# Patient Record
Sex: MALE
[De-identification: names, ages, dates, MRNs, and addresses within clinical notes are randomized per-mention and may not be internally consistent; named-entity substitution may affect disease eponyms.]

---

## 2017-01-01 ENCOUNTER — HOSPITAL (OUTPATIENT)
Dept: OTHER | Age: 47
End: 2017-01-01
Attending: EMERGENCY MEDICINE

## 2017-07-19 PROBLEM — R13.12 OROPHARYNGEAL DYSPHAGIA: Status: ACTIVE | Noted: 2017-07-19

## 2018-10-24 ENCOUNTER — HOSPITAL ENCOUNTER (EMERGENCY)
Facility: HOSPITAL | Age: 48
Discharge: HOME | End: 2018-10-25
Attending: EMERGENCY MEDICINE
Payer: COMMERCIAL

## 2018-10-24 DIAGNOSIS — T18.128A FOOD IMPACTION OF ESOPHAGUS, INITIAL ENCOUNTER: Primary | ICD-10-CM

## 2018-10-24 PROCEDURE — 99285 EMERGENCY DEPT VISIT HI MDM: CPT

## 2018-10-24 PROCEDURE — 96374 THER/PROPH/DIAG INJ IV PUSH: CPT

## 2018-10-24 PROCEDURE — 96375 TX/PRO/DX INJ NEW DRUG ADDON: CPT

## 2018-10-24 PROCEDURE — 96361 HYDRATE IV INFUSION ADD-ON: CPT

## 2018-10-24 RX ORDER — ONDANSETRON 2 MG/ML
4 INJECTION INTRAMUSCULAR; INTRAVENOUS ONCE
Status: COMPLETED | OUTPATIENT
Start: 2018-10-24 | End: 2018-10-24

## 2018-10-25 ENCOUNTER — ANESTHESIA (OUTPATIENT)
Dept: ENDOSCOPY | Facility: HOSPITAL | Age: 48
End: 2018-10-25
Payer: COMMERCIAL

## 2018-10-25 ENCOUNTER — ANESTHESIA EVENT (OUTPATIENT)
Dept: ENDOSCOPY | Facility: HOSPITAL | Age: 48
End: 2018-10-25
Payer: COMMERCIAL

## 2018-10-25 VITALS
TEMPERATURE: 99 F | OXYGEN SATURATION: 100 % | HEART RATE: 53 BPM | WEIGHT: 184 LBS | RESPIRATION RATE: 14 BRPM | SYSTOLIC BLOOD PRESSURE: 120 MMHG | DIASTOLIC BLOOD PRESSURE: 79 MMHG | BODY MASS INDEX: 27.89 KG/M2 | HEIGHT: 68 IN

## 2018-10-25 PROBLEM — T18.128A FOOD IMPACTION OF ESOPHAGUS, INITIAL ENCOUNTER: Status: ACTIVE | Noted: 2018-10-25
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
## 2018-10-25 PROCEDURE — 88312 SPECIAL STAINS GROUP 1: CPT | Performed by: INTERNAL MEDICINE

## 2018-10-25 PROCEDURE — A4216 STERILE WATER/SALINE, 10 ML: HCPCS

## 2018-10-25 PROCEDURE — 0DB18ZX EXCISION OF UPPER ESOPHAGUS, VIA NATURAL OR ARTIFICIAL OPENING ENDOSCOPIC, DIAGNOSTIC: ICD-10-PCS | Performed by: INTERNAL MEDICINE

## 2018-10-25 PROCEDURE — 0DC18ZZ EXTIRPATION OF MATTER FROM UPPER ESOPHAGUS, VIA NATURAL OR ARTIFICIAL OPENING ENDOSCOPIC: ICD-10-PCS | Performed by: INTERNAL MEDICINE

## 2018-10-25 PROCEDURE — 88305 TISSUE EXAM BY PATHOLOGIST: CPT | Performed by: INTERNAL MEDICINE

## 2018-10-25 RX ORDER — SODIUM CHLORIDE, SODIUM LACTATE, POTASSIUM CHLORIDE, CALCIUM CHLORIDE 600; 310; 30; 20 MG/100ML; MG/100ML; MG/100ML; MG/100ML
INJECTION, SOLUTION INTRAVENOUS CONTINUOUS PRN
Status: DISCONTINUED | OUTPATIENT
Start: 2018-10-25 | End: 2018-10-25 | Stop reason: SURG

## 2018-10-25 RX ORDER — LIDOCAINE HYDROCHLORIDE 10 MG/ML
INJECTION, SOLUTION EPIDURAL; INFILTRATION; INTRACAUDAL; PERINEURAL AS NEEDED
Status: DISCONTINUED | OUTPATIENT
Start: 2018-10-25 | End: 2018-10-25 | Stop reason: SURG

## 2018-10-25 RX ORDER — NITROGLYCERIN 0.4 MG/1
0.4 TABLET SUBLINGUAL ONCE
Status: COMPLETED | OUTPATIENT
Start: 2018-10-25 | End: 2018-10-25

## 2018-10-25 RX ORDER — SODIUM CHLORIDE, SODIUM LACTATE, POTASSIUM CHLORIDE, CALCIUM CHLORIDE 600; 310; 30; 20 MG/100ML; MG/100ML; MG/100ML; MG/100ML
INJECTION, SOLUTION INTRAVENOUS CONTINUOUS
Status: DISCONTINUED | OUTPATIENT
Start: 2018-10-25 | End: 2018-10-25

## 2018-10-25 RX ORDER — NALOXONE HYDROCHLORIDE 0.4 MG/ML
80 INJECTION, SOLUTION INTRAMUSCULAR; INTRAVENOUS; SUBCUTANEOUS AS NEEDED
Status: DISCONTINUED | OUTPATIENT
Start: 2018-10-25 | End: 2018-10-25

## 2018-10-25 RX ADMIN — LIDOCAINE HYDROCHLORIDE 25 MG: 10 INJECTION, SOLUTION EPIDURAL; INFILTRATION; INTRACAUDAL; PERINEURAL at 06:15:00

## 2018-10-25 RX ADMIN — SODIUM CHLORIDE, SODIUM LACTATE, POTASSIUM CHLORIDE, CALCIUM CHLORIDE: 600; 310; 30; 20 INJECTION, SOLUTION INTRAVENOUS at 06:14:00

## 2018-10-25 RX ADMIN — SODIUM CHLORIDE, SODIUM LACTATE, POTASSIUM CHLORIDE, CALCIUM CHLORIDE: 600; 310; 30; 20 INJECTION, SOLUTION INTRAVENOUS at 06:25:00

## 2018-10-25 NOTE — ED NOTES
Pt reports that around 1600 he was eating a quinoa bowl and feels like he has a piece of cucumber stuck in his throat. Reports that he took a large bite of food, had the food get stuck and so he spit out the rest of the bite.  States that this has happened

## 2018-10-25 NOTE — ED NOTES
Gave report to endo RN. Transport at bedside to transport pt for egd. Pt stable airway intact. No distress noted.

## 2018-10-25 NOTE — CONSULTS
[facility] HOSP - [facility]    Report of Consultation    Mayramariella Lockharts Patient Status:  Emergency    3/9/1970 MRN [id number]   Location [address] Attending Maynor Lao MD   Hosp Day # 0 PCP [place] Marie MD     Date no focal weakness or numbness  PSYCHE: no depression or anxiety  HEMATOLOGIC: no easy bruising  ENDOCRINE: no temperature intolerance    Physical Exam:   Blood pressure 128/78, pulse 54, temperature 98.7 °F (37.1 °C), temperature source Oral, resp.  rate 15

## 2018-10-25 NOTE — ANESTHESIA PREPROCEDURE EVALUATION
Anesthesia PreOp Note    HPI:     Fer Jordan is a 46year old male who presents for preoperative consultation requested by: Stephanie Ceja MD    Date of Surgery: 10/24/2018 - 10/25/2018    Procedure(s):  ESOPHAGOGASTRODUODENOSCOPY WITH CONTROL BLEED Drug use: No      Sexual activity: Not on file    Other Topics      Concerns:        Not on file    Social History Narrative      Not on file      Available pre-op labs reviewed. Vital Signs: Body mass index is 27.98 kg/m².    height is 1.727

## 2018-10-25 NOTE — OPERATIVE REPORT
ESOPHAGOGASTRODUODENOSCOPY REPORT    Patient Name:  Sophie Lucero Record #: [id number]  YOB: 1970  Date of Procedure: 10/25/2018    Referring physician: Samuel Kapadia MD    Surgeon:  Brit Nieves MD    Pre-op diagnosis: Food i hiatal hernia    Plan:   Await biopsy results  Will start patient on BID PPI  Will have patient return in 3-4 weeks for EGD and consider dilation of mild stricturing in the proximal esophagus, In the interim ensure chew food thoroughly and eat soft diet

## 2018-10-25 NOTE — ED PROVIDER NOTES
Patient Seen in: [facility] AND [facility] Emergency Department    History   Patient presents with:  FB in Throat (GI, respiratory)    Stated Complaint: food bolus no trouble breathing      HPI    47 yo M without PMH presenting for evaluation of now 7+ hours of snowden Resp 20   Wt 83.5 kg   SpO2 100%   BMI 28.39 kg/m²         Physical Exam   Constitutional: No distress. Nontoxic, spitting up saliva. HEENT: MMM. No drooling/stridor. No uvular edema. Head: Normocephalic. Eyes: No injection. Neck: No crepitance.    Ca

## 2018-10-25 NOTE — ED INITIAL ASSESSMENT (HPI)
Patient was eating around 4 pm when he felt like a piece of his food got stuck in his throat. Patient states this happens around once a year. Denies SOB at this time and is speaking in full sentences, but is unable to swallow saliva.

## 2018-10-25 NOTE — ANESTHESIA POSTPROCEDURE EVALUATION
Patient: Marty Cantrell    Procedure Summary     Date:  10/25/18 Room / Location:  [address] [place] [address] ENDOSCOPY 01 / [address] [place] [address] ENDOSCOPY    Anesthesia Start:  7336 Anesthesia Stop:  7204    Procedure:  ESOPHAGOGASTRODUODENOSCOPY WITH CONTROL BLEED OR FOREIGN BODY REMOVAL (N/

## 2019-06-27 ENCOUNTER — HOSPITAL ENCOUNTER (OUTPATIENT)
Age: 49
Discharge: ANOTHER HOSPITAL | End: 2019-06-27
Attending: FAMILY MEDICINE
Payer: COMMERCIAL

## 2019-06-27 ENCOUNTER — HOSPITAL ENCOUNTER (EMERGENCY)
Facility: HOSPITAL | Age: 49
Discharge: HOME | End: 2019-06-27
Attending: EMERGENCY MEDICINE
Payer: COMMERCIAL

## 2019-06-27 ENCOUNTER — APPOINTMENT (OUTPATIENT)
Dept: GENERAL RADIOLOGY | Facility: HOSPITAL | Age: 49
End: 2019-06-27
Attending: EMERGENCY MEDICINE
Payer: COMMERCIAL

## 2019-06-27 VITALS
SYSTOLIC BLOOD PRESSURE: 147 MMHG | HEART RATE: 46 BPM | TEMPERATURE: 99 F | RESPIRATION RATE: 18 BRPM | BODY MASS INDEX: 28.04 KG/M2 | HEIGHT: 68 IN | WEIGHT: 185 LBS | OXYGEN SATURATION: 98 % | DIASTOLIC BLOOD PRESSURE: 95 MMHG

## 2019-06-27 VITALS — HEART RATE: 76 BPM | TEMPERATURE: 98 F | RESPIRATION RATE: 24 BRPM

## 2019-06-27 DIAGNOSIS — S61.313A LACERATION OF LEFT MIDDLE FINGER WITHOUT FOREIGN BODY WITH DAMAGE TO NAIL, INITIAL ENCOUNTER: Primary | ICD-10-CM

## 2019-06-27 DIAGNOSIS — S67.10XA CRUSHING INJURY OF FINGER, INITIAL ENCOUNTER: Primary | ICD-10-CM

## 2019-06-27 PROCEDURE — 99213 OFFICE O/P EST LOW 20 MIN: CPT

## 2019-06-27 PROCEDURE — 73140 X-RAY EXAM OF FINGER(S): CPT | Performed by: EMERGENCY MEDICINE

## 2019-06-27 PROCEDURE — 99202 OFFICE O/P NEW SF 15 MIN: CPT

## 2019-06-27 PROCEDURE — 12001 RPR S/N/AX/GEN/TRNK 2.5CM/<: CPT

## 2019-06-27 PROCEDURE — 99284 EMERGENCY DEPT VISIT MOD MDM: CPT

## 2019-06-27 RX ORDER — LIDOCAINE HYDROCHLORIDE 10 MG/ML
20 INJECTION, SOLUTION EPIDURAL; INFILTRATION; INTRACAUDAL; PERINEURAL ONCE
Status: COMPLETED | OUTPATIENT
Start: 2019-06-27 | End: 2019-06-27

## 2019-06-27 RX ORDER — LIDOCAINE HYDROCHLORIDE 10 MG/ML
INJECTION, SOLUTION EPIDURAL; INFILTRATION; INTRACAUDAL; PERINEURAL
Status: COMPLETED
Start: 2019-06-27 | End: 2019-06-27

## 2019-06-27 RX ORDER — CLINDAMYCIN HYDROCHLORIDE 300 MG/1
300 CAPSULE ORAL 3 TIMES DAILY
Qty: 21 CAPSULE | Refills: 0 | Status: SHIPPED | OUTPATIENT
Start: 2019-06-27 | End: 2019-07-04

## 2019-06-27 NOTE — ED PROVIDER NOTES
Patient Seen in: [address]    History   Patient presents with:   Worker's Comp  Contusion (musculoskeletal)    Stated Complaint: w/c l-hand 3 finger injury    HPI  Pt is a 45 yo with a left 3rd finger crush injury at work encounter diagnosis)    Disposition: Ic to ed  6/27/2019  6:13 pm    Follow-up:  No follow-up provider specified.       Medications Prescribed:  Current Discharge Medication List

## 2019-06-27 NOTE — ED INITIAL ASSESSMENT (HPI)
PATIENT WITH CRUSH INJURY TO LEFT 3RD FINGER.  + LACERATION.   STATES IT WAS SUSTAINED AT WORK THIS EVENING WHEN A REFRIGERATOR DOOR FELL ON TO HIS FINGER AS HE WAS TRYING TO REPAIR IT.  MD AT BEDSIDE, DETERMINED THAT PATIENT NEEDS TO PRESENT TO THE ED FOR

## 2019-06-27 NOTE — ED INITIAL ASSESSMENT (HPI)
TRIAGE: pt reports middle digit on left hand with smashed digit while repairing a refrigerator, sent here from immediate care

## 2019-06-28 NOTE — ED PROVIDER NOTES
Patient Seen in: [facility] AND [facility] Emergency Department    History   Patient presents with:  Trauma (cardiovascular, musculoskeletal)  Finger Pain    Stated Complaint: finger pain    HPI    47-year-old left-hand-dominant male without medical problems who dorsal tip laceration including the nail and likely nailbed of the third digit. There is partial avulsion injury. There is likely underlying crush injury to the bone. Distal to perfusion is intact however.   There is no injury proximal to the nail or the The laceration was anesthetized in the usual fashion with 1% plain lidocaine as a digital block as listed above. The wound was cleaned, draped and explored and there was not obvious evidence of a nailbed laceration.   The remainder of the wound was repaired

## 2024-05-06 ENCOUNTER — HOSPITAL ENCOUNTER (EMERGENCY)
Facility: HOSPITAL | Age: 54
Discharge: HOME | End: 2024-05-06
Attending: EMERGENCY MEDICINE

## 2024-05-06 VITALS
HEART RATE: 65 BPM | HEIGHT: 67.5 IN | DIASTOLIC BLOOD PRESSURE: 79 MMHG | RESPIRATION RATE: 16 BRPM | SYSTOLIC BLOOD PRESSURE: 161 MMHG | TEMPERATURE: 98 F | OXYGEN SATURATION: 100 % | WEIGHT: 177.94 LBS | BODY MASS INDEX: 27.6 KG/M2

## 2024-05-06 DIAGNOSIS — T18.108A FOREIGN BODY IN ESOPHAGUS, INITIAL ENCOUNTER: Primary | ICD-10-CM

## 2024-05-06 PROCEDURE — 99283 EMERGENCY DEPT VISIT LOW MDM: CPT

## 2024-05-06 PROCEDURE — 99282 EMERGENCY DEPT VISIT SF MDM: CPT

## 2024-05-06 NOTE — ED PROVIDER NOTES
Patient Seen in: [facility] Emergency Department      History     Chief Complaint   Patient presents with    FB in Throat     Stated Complaint: piece of food stuck in esophagus    Subjective:   HPI  Pt is 55 yo M who p/w piece of steak stuck in throat since 430 pm. Has had endoscopy in past for food bolus and feels similar. Unable to swallow saliva due to sensation. No sob. No vomiting.      States upon arrival to ED he feels the steak moved down and he has relief.     Objective:   Past Medical History:    Attention deficit disorder without mention of hyperactivity              Past Surgical History:   Procedure Laterality Date    Colonoscopy N/A 3/14/2022    Procedure: COLONOSCOPY w/ polypectomy;  Surgeon: Lev Mclaughlin MD;  [facility]: [facility]    Vasectomy                  Social History     Socioeconomic History    Marital status:    Tobacco Use    Smoking status: Never    Smokeless tobacco: Never   Vaping Use    Vaping status: Never Used   Substance and Sexual Activity    Alcohol use: Yes     Comment: social    Drug use: No    Sexual activity: Yes     Partners: Female              Review of Systems    Positive for stated complaint: piece of food stuck in esophagus  Other systems are as noted in HPI.  Constitutional and vital signs reviewed.      All other systems reviewed and negative except as noted above.    Physical Exam     ED Triage Vitals [05/06/24 0235]   BP (!) 161/79   Pulse 65   Resp 16   Temp 97.5 °F (36.4 °C)   Temp src Temporal   SpO2 100 %   O2 Device None (Room air)       Current:BP (!) 161/79   Pulse 65   Temp 97.5 °F (36.4 °C) (Temporal)   Resp 16   Ht 171.5 cm (5' 7.5\")   Wt 80.7 kg   SpO2 100%   BMI 27.45 kg/m²         Physical Exam    GENERAL: No acute distress, awake and alert  HEENT: EOMI, PERRL, oropharynx normal  Neck: supple  CV: RRR, no murmurs  Resp: CTAB, no wheezes or retractions  Extremities: FROM of all extremities  Neuro: CN intact, normal  speech, normal gait, 5/5 motor strength in all extremities, no focal deficits  SKIN: warm, dry, no rashes      ED Course   Labs Reviewed - No data to display       MDM         Medical Decision Making  Pt observed in ED and able to swallow water. Feels comfortable going home with GI f/u         Disposition and Plan     Clinical Impression:  1. Foreign body in esophagus, initial encounter         Disposition:  Discharge  5/6/2024  3:31 am    Follow-up:  Jim Reyes MD  [address]  [address]  [facility] [address]  [phone]    Follow up            Medications Prescribed:  Current Discharge Medication List

## 2024-05-06 NOTE — ED QUICK NOTES
Patient drinking water without discomfort. Feels like the bolus has passed. Managing secretions and speaking in full clear sentences.

## 2024-05-06 NOTE — ED INITIAL ASSESSMENT (HPI)
54y M to ED via personal car with c/o food bolus stuck in throat. Patient reports a history of esophageal strictures. Has a history of food boluses getting stuck in esophagus occasionally if he doesn't chew thoroughly. Typically, the bolus passes over time. Today, he was eating steak when he felt a piece get stuck around 4:30pm. Patient now feels the bolus stuck in upper chest. Can feel some burps escaping, but is unable to swallow saliva and is spitting it out in a cup. Attempted to drink carbonated water to aid in passing without success.

## (undated) DEVICE — ENDOSCOPY PACK UPPER: Brand: MEDLINE INDUSTRIES, INC.

## (undated) DEVICE — Device: Brand: DEFENDO AIR/WATER/SUCTION AND BIOPSY VALVE

## (undated) DEVICE — FORCEP RADIAL JAW 4

## (undated) NOTE — ED AVS SNAPSHOT
Nico Bhatt   MRN: [id number]    Department:  [facility] Emergency Department   Date of Visit:  6/27/2019           Disclosure     Insurance plans vary and the physician(s) referred by the ER may not be covered by your plan.  Please contact CARE PHYSICIAN AT ONCE OR RETURN IMMEDIATELY TO THE EMERGENCY DEPARTMENT. If you have been prescribed any medication(s), please fill your prescription right away and begin taking the medication(s) as directed.   If you believe that any of the medications